# Patient Record
Sex: FEMALE | Race: WHITE | URBAN - METROPOLITAN AREA
[De-identification: names, ages, dates, MRNs, and addresses within clinical notes are randomized per-mention and may not be internally consistent; named-entity substitution may affect disease eponyms.]

---

## 2017-02-28 ENCOUNTER — OFFICE VISIT (OUTPATIENT)
Dept: INTERNAL MEDICINE | Facility: CLINIC | Age: 42
End: 2017-02-28
Payer: COMMERCIAL

## 2017-02-28 VITALS
HEIGHT: 66 IN | SYSTOLIC BLOOD PRESSURE: 94 MMHG | DIASTOLIC BLOOD PRESSURE: 60 MMHG | OXYGEN SATURATION: 96 % | TEMPERATURE: 98.4 F | WEIGHT: 137.5 LBS | HEART RATE: 75 BPM | BODY MASS INDEX: 22.1 KG/M2

## 2017-02-28 DIAGNOSIS — M54.6 CHRONIC BILATERAL THORACIC BACK PAIN: Primary | ICD-10-CM

## 2017-02-28 DIAGNOSIS — G89.29 CHRONIC BILATERAL THORACIC BACK PAIN: Primary | ICD-10-CM

## 2017-02-28 DIAGNOSIS — M54.2 NECK PAIN: ICD-10-CM

## 2017-02-28 PROCEDURE — 99213 OFFICE O/P EST LOW 20 MIN: CPT | Performed by: INTERNAL MEDICINE

## 2017-02-28 RX ORDER — FENOPROFEN CALCIUM 400 MG/1
CAPSULE ORAL
Refills: 3 | COMMUNITY
Start: 2017-02-21 | End: 2017-04-25

## 2017-02-28 RX ORDER — DURACHOL 3775; 1 [IU]/1; MG/1
CAPSULE ORAL
Refills: 3 | COMMUNITY
Start: 2017-02-06 | End: 2017-04-25

## 2017-02-28 RX ORDER — BUPROPION HYDROCHLORIDE 150 MG/1
TABLET ORAL
COMMUNITY
Start: 2017-02-08

## 2017-02-28 RX ORDER — LIDOCAINE 50 MG/G
OINTMENT TOPICAL
Refills: 3 | COMMUNITY
Start: 2017-02-06

## 2017-02-28 NOTE — PATIENT INSTRUCTIONS
Best next step is trial of physical therapy.    Naproxen 2 tabs twice a day as needed and warm compresses as needed.

## 2017-02-28 NOTE — NURSING NOTE
"Chief Complaint   Patient presents with     Musculoskeletal Problem     X 3 weeks. Mid back pain radiating to the back of neck and head and down to RLE.       Initial BP 94/60 (BP Location: Left arm, Patient Position: Chair, Cuff Size: Adult Regular)  Pulse 75  Temp 98.4  F (36.9  C) (Oral)  Ht 5' 6.2\" (1.681 m)  Wt 137 lb 8 oz (62.4 kg)  LMP 02/07/2017 (Approximate)  SpO2 96%  Breastfeeding? No  BMI 22.06 kg/m2 Estimated body mass index is 22.06 kg/(m^2) as calculated from the following:    Height as of this encounter: 5' 6.2\" (1.681 m).    Weight as of this encounter: 137 lb 8 oz (62.4 kg).  Medication Reconciliation: complete     Kaminibose MA      "

## 2017-02-28 NOTE — PROGRESS NOTES
"  SUBJECTIVE:                                                      HPI: Petra Diallo is a pleasant 41 year old female who presents with back pain:    - has history of chronic mid-back pain - indicates mid-lower thoracic spine  - precipitated by gymnastics injury when she was younger  - suffered a \"thoracic vertebral strain\"  - no particular interventions (procedures, surgeries, therapy)    - reports that back pain worsened ~3-4 weeks ago  - no precipitating injury, trauma, or unusual exertion  - pain is bilateral, R>L  - reports that pain radiates upwards to right neck and back of right head  - describes as dull and burning  - always present  - worse at the end of the day  - better with yoga and stretches (holding knees over head)    - reports that entire RUE \"goes limp\" or starts tingling sometimes - not present currently    Has gone to chiropractor twice since back pain worsened - didn't help.    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     The medication, allergy, and problem lists have been reviewed and updated as appropriate.     OBJECTIVE:                                                      BP 94/60 (BP Location: Left arm, Patient Position: Chair, Cuff Size: Adult Regular)  Pulse 75  Temp 98.4  F (36.9  C) (Oral)  Ht 5' 6.2\" (1.681 m)  Wt 137 lb 8 oz (62.4 kg)  LMP 02/07/2017 (Approximate)  SpO2 96%  Breastfeeding? No  BMI 22.06 kg/m2  Constitutional: well-appearing  Cervical spine: no deformity, crepitus, or step-off; no spinal or marlyn-spinal tenderness to palpation  Neck: supple, symmetric; normal ROM  RUE: strength full; normal ROM  Thoracic/lumbar spine: no deformity, crepitus, or step-off; no spinal or marlyn-spinal tenderness to palpation  Musculoskeletal: right trapezius tenderness to palpation    ASSESSMENT/PLAN:                                                      (M54.6,  G89.29) Chronic bilateral thoracic back pain  (primary encounter " diagnosis)  Comment:    - worsening ~3-4 weeks ago - no precipitating event.   - normal exam.   Plan:   - referred to PT for further evaluation and treatment.   - if symptoms worsen, change, or don't improve with above, patient to contact MD.    (M54.2) Neck pain  Comment: suspect right trapezius tension/strain.   Plan:    - referred to PT for further evaluation and treatment.   - if symptoms worsen, change, or don't improve with above, patient to contact MD.    The instructions on the AVS were discussed and explained to the patient. Patient expressed understanding of instructions.    Jeniffer Etienne MD   [place] [address] [address]  T: [phone], F: [phone]

## 2017-02-28 NOTE — MR AVS SNAPSHOT
After Visit Summary   2/28/2017    Petra Diallo    MRN: [id number]           Patient Information     Date Of Birth          1975        Visit Information        Provider Department      2/28/2017 1:30 PM Jeniffer Etienne MD [facility]        Today's Diagnoses     Neck pain    -  1    Chronic bilateral thoracic back pain          Care Instructions    Best next step is trial of physical therapy.    Naproxen 2 tabs twice a day as needed and warm compresses as needed.         Follow-ups after your visit        Additional Services     [facility] PT, HAND, AND CHIROPRACTIC REFERRAL       **This order will print in the [facility] Scheduling Office**    Physical Therapy, Hand Therapy and Chiropractic Care are available through:    *[facility]  *[facility]  *[facility]    Call one number to schedule at any of the above locations: [phone].    Your provider has referred you to: [facility] - PT and/or Chiropractic Care determined by clinical presentation at [facility] or [facility] Initial Visit    Indication/Reason for Referral: Neck Pain and Thoracic Pain  Onset of Illness: 3-4 weeks  Therapy Orders: Evaluate and Treat  Special Programs: None  Special Request: None    Edgar Hernández      Additional Comments for the Therapist or Chiropractor: none    Please be aware that coverage of these services is subject to the terms and limitations of your health insurance plan.  Call member services at your health plan with any benefit or coverage questions.      Please bring the following to your appointment:    *Your personal calendar for scheduling future appointments  *Comfortable clothing                  Who to contact     If you have questions or need follow up information about today's clinic visit or your schedule please contact [facility] directly at [phone].  Normal or non-critical lab and imaging  "results will be communicated to you by MyChart, letter or phone within 4 business days after the clinic has received the results. If you do not hear from us within 7 days, please contact the clinic through Eatwave or phone. If you have a critical or abnormal lab result, we will notify you by phone as soon as possible.  Submit refill requests through Eatwave or call your pharmacy and they will forward the refill request to us. Please allow 3 business days for your refill to be completed.          Additional Information About Your Visit        Eatwave Information     Eatwave lets you send messages to your doctor, view your test results, renew your prescriptions, schedule appointments and more. To sign up, go to [email or web address] . Click on \"Log in\" on the left side of the screen, which will take you to the Welcome page. Then click on \"Sign up Now\" on the right side of the page.     You will be asked to enter the access code listed below, as well as some personal information. Please follow the directions to create your username and password.     Your access code is: [email or web address]  Expires: 2017  1:53 PM     Your access code will  in 90 days. If you need help or a new code, please call your [place] clinic or [phone].        Care EveryWhere ID     This is your Care EveryWhere ID. This could be used by other organizations to access your [place] medical records  [id number]        Your Vitals Were     Pulse Temperature Height Last Period Pulse Oximetry Breastfeeding?    75 98.4  F (36.9  C) (Oral) 5' 6.2\" (1.681 m) 2017 (Approximate) 96% No    BMI (Body Mass Index)                   22.06 kg/m2            Blood Pressure from Last 3 Encounters:   17 94/60   03/10/16 102/56    Weight from Last 3 Encounters:   17 137 lb 8 oz (62.4 kg)   03/10/16 128 lb (58.1 kg)              We Performed the Following     [facility] PT, HAND, AND CHIROPRACTIC REFERRAL        Primary Care Provider    None " Specified       No primary provider on file.        Thank you!     Thank you for choosing [facility]  for your care. Our goal is always to provide you with excellent care. Hearing back from our patients is one way we can continue to improve our services. Please take a few minutes to complete the written survey that you may receive in the mail after your visit with us. Thank you!             Your Updated Medication List - Protect others around you: Learn how to safely use, store and throw away your medicines at www.disposemymeds.org.          This list is accurate as of: 2/28/17  1:53 PM.  Always use your most recent med list.                   Brand Name Dispense Instructions for use    buPROPion 150 MG 24 hr tablet    WELLBUTRIN XL     Take one tablet by mouth every morning.       DURACHOL 1-3775 MG-UNIT Caps   Generic drug:  Folic Acid-Cholecalciferol          Fenoprofen Calcium 400 MG Caps          GABAPENTIN PO      Take 200 mg by mouth 2 times daily       lidocaine 5 % ointment    XYLOCAINE         PAXIL PO      Take 20 mg by mouth daily

## 2017-03-07 ENCOUNTER — THERAPY VISIT (OUTPATIENT)
Dept: PHYSICAL THERAPY | Facility: CLINIC | Age: 42
End: 2017-03-07
Payer: COMMERCIAL

## 2017-03-07 DIAGNOSIS — M54.50 THORACOLUMBAR BACK PAIN: Primary | ICD-10-CM

## 2017-03-07 DIAGNOSIS — M54.6 THORACOLUMBAR BACK PAIN: Primary | ICD-10-CM

## 2017-03-07 PROCEDURE — 97161 PT EVAL LOW COMPLEX 20 MIN: CPT | Mod: GP | Performed by: PHYSICAL THERAPIST

## 2017-03-07 PROCEDURE — 97110 THERAPEUTIC EXERCISES: CPT | Mod: GP | Performed by: PHYSICAL THERAPIST

## 2017-03-07 NOTE — MR AVS SNAPSHOT
"              After Visit Summary   3/7/2017    Petra Diallo    MRN: [id number]           Patient Information     Date Of Birth          1975        Visit Information        Provider Department      3/7/2017 2:40 PM Mona Flores PT [facility] Physical Therapy        Today's Diagnoses     Thoracolumbar back pain    -  1       Follow-ups after your visit        Your next 10 appointments already scheduled     Mar 21, 2017  3:20 PM CDT   [facility] Spine with Mona Flores PT   [facility]    [address] [id number] [phone]              Who to contact     If you have questions or need follow up information about today's clinic visit or your schedule please contact [facility] PHYSICAL THERAPY directly at [phone].  Normal or non-critical lab and imaging results will be communicated to you by CrossChxhart, letter or phone within 4 business days after the clinic has received the results. If you do not hear from us within 7 days, please contact the clinic through CrossChxhart or phone. If you have a critical or abnormal lab result, we will notify you by phone as soon as possible.  Submit refill requests through [employer] or call your pharmacy and they will forward the refill request to us. Please allow 3 business days for your refill to be completed.          Additional Information About Your Visit        MyChart Information     [employer] lets you send messages to your doctor, view your test results, renew your prescriptions, schedule appointments and more. To sign up, go to [email or web address] . Click on \"Log in\" on the left side of the screen, which will take you to the Welcome page. Then click on \"Sign up Now\" on the right side of the page.     You will be asked to enter the access code listed below, as well as some personal information. Please " follow the directions to create your username and password.     Your access code is: [email or web address]  Expires: 2017  1:53 PM     Your access code will  in 90 days. If you need help or a new code, please call your [place] clinic or [phone].        Care EveryWhere ID     This is your Care EveryWhere ID. This could be used by other organizations to access your [place] medical records  [id number]        Your Vitals Were     Last Period                   2017 (Approximate)            Blood Pressure from Last 3 Encounters:   17 94/60   03/10/16 102/56    Weight from Last 3 Encounters:   17 62.4 kg (137 lb 8 oz)   03/10/16 58.1 kg (128 lb)              We Performed the Following     [facility] Inital Eval Report     PT Eval, Low Complexity ([id number])     Therapeutic Exercises        Primary Care Provider    None Specified       No primary provider on file.        Thank you!     Thank you for choosing [place] [facility] PHYSICAL THERAP[facility]  for your care. Our goal is always to provide you with excellent care. Hearing back from our patients is one way we can continue to improve our services. Please take a few minutes to complete the written survey that you may receive in the mail after your visit with us. Thank you!             Your Updated Medication List - Protect others around you: Learn how to safely use, store and throw away your medicines at www.disposemymeds.org.          This list is accurate as of: 3/7/17  4:29 PM.  Always use your most recent med list.                   Brand Name Dispense Instructions for use    buPROPion 150 MG 24 hr tablet    WELLBUTRIN XL     Take one tablet by mouth every morning.       DURACHOL 1-3775 MG-UNIT Caps   Generic drug:  Folic Acid-Cholecalciferol          Fenoprofen Calcium 400 MG Caps          GABAPENTIN PO      Take 200 mg by mouth 2 times daily       lidocaine 5 % ointment    XYLOCAINE         PAXIL PO      Take 20 mg by mouth  daily

## 2017-03-07 NOTE — PROGRESS NOTES
"Subjective:    Petra Diallo is a 41 year old female with a thoracic spine condition.  Condition occurred with:  Insidious onset.  Condition occurred: for unknown reasons.  This is a chronic condition  Pt presents with complaints of T-L pain, aggravated in intensity about one month ago. Pt reported long history of thoracic/lumbar pain following a \"back tuck\" in gymnastics (11th grade). Pt reported she was unable to return to gymnastics in 12th grade. Pt reported issues with back pain since that time, waxing and waning over the years. Pt reported use of chiropractic care about one year ago; pt reported going for adjustments every other week for about a year. Pt reported temporary improvement; pt discontinued about 1 month ago after she no longer received pain relief. Pt reported currently using tylenol on a daily basis for the past month for pain management.    Patient reports pain:  Thoracic left side and mid thoracic.  Radiates to:  Head and other (neck and right buttock/proximal thigh).  Pain is described as burning and other (dull) and is constant and reported as 5/10.  Associated symptoms:  Headache (pt reported waking every morning with neck pain and a HA; currently using OTC tylenol for pain management on a daily basis for the past month). Pain is worse during the day and worse during the night.  Symptoms are exacerbated by lying down (pt reported waking every other night due to back pain; able to relieve sx's with lying on her back with her knees bent up and over her head) and relieved by other (tylenol).  Since onset symptoms are unchanged.    Previous treatment includes chiropractic.  There was moderate (temporary relief; 2x/month for 1 year) improvement following previous treatment.  General health as reported by patient is good.                  Barriers include:  None as reported by the patient.    Red flags:  None as reported by the patient.                      Objective:    Standing Alignment:  " "  Cervical/Thoracic:  Thoracic kyphosis increased and forward head  Shoulder/UE:  Rounded shoulders                             Lumbar/SI Evaluation  ROM:  AROM Lumbar: normal (increased T-L pain with end range extension in standing and with JO)  AROM Thoracic:  Flex:               Ext:                WFL's; increased T-L pain at end range in sitting, standing and JO  Rotation:        Left:  WNL    Right:  Increased midline pain at end range                       Spinal Segmental Conclusions: Reports of \"burning\" with PA's to lower thoracic spine    Level: Hypo noted at T11 and L1           Cervical/Thoracic Evaluation  Cervical AROM: normal (reports of upper cervical neck pain at end range extension)                                  Shoulder Evaluation:  ROM:  AROM:  normal                                                                             General     ROS    Assessment/Plan:      Patient is a 41 year old female with thoracic complaints.    Patient has the following significant findings with corresponding treatment plan.                Diagnosis 1:  T-L pain  Pain -  manual therapy, self management, education and home program  Decreased ROM/flexibility - manual therapy and therapeutic exercise  Decreased joint mobility - manual therapy and therapeutic exercise  Decreased function - therapeutic activities  Impaired posture - neuro re-education    Therapy Evaluation Codes:   1) History comprised of:   Personal factors that impact the plan of care:      Time since onset of symptoms.    Comorbidity factors that impact the plan of care are:      None.     Medications impacting care: None.  2) Examination of Body Systems comprised of:   Body structures and functions that impact the plan of care:      Cervical spine, Lumbar spine and Thoracic Spine.   Activity limitations that impact the plan of care are:      Reading/Computer work and Sleeping.  3) Clinical presentation characteristics " are:   Stable/Uncomplicated.  4) Decision-Making    Low complexity using standardized patient assessment instrument and/or measureable assessment of functional outcome.  Cumulative Therapy Evaluation is: Low complexity.    Previous and current functional limitations:  (See Goal Flow Sheet for this information)    Short term and Long term goals: (See Goal Flow Sheet for this information)     Communication ability:  Patient appears to be able to clearly communicate and understand verbal and written communication and follow directions correctly.  Treatment Explanation - The following has been discussed with the patient:   RX ordered/plan of care  Anticipated outcomes  Possible risks and side effects  This patient would benefit from PT intervention to resume normal activities.   Rehab potential is good.    Frequency:  1 X week, once daily  Duration:  for 4-6 visits  Discharge Plan:  Achieve all LTG.  Independent in home treatment program.  Reach maximal therapeutic benefit.    Please refer to the daily flowsheet for treatment today, total treatment time and time spent performing 1:1 timed codes.

## 2017-03-08 NOTE — PROGRESS NOTES
Subjective:                                       Pertinent medical history includes:  Depression, migraines and other (Changes in bowel/bladder, headaches).      Current medications:  Anti-depressants.  Current occupation is Occupational Therapy.    Primary job tasks include:  Prolonged standing and lifting.                              Objective:    System    Physical Exam    General     ROS    Assessment/Plan:

## 2017-04-25 ENCOUNTER — OFFICE VISIT (OUTPATIENT)
Dept: INTERNAL MEDICINE | Facility: CLINIC | Age: 42
End: 2017-04-25
Payer: COMMERCIAL

## 2017-04-25 VITALS
SYSTOLIC BLOOD PRESSURE: 102 MMHG | TEMPERATURE: 97.6 F | DIASTOLIC BLOOD PRESSURE: 60 MMHG | BODY MASS INDEX: 20.99 KG/M2 | HEIGHT: 66 IN | OXYGEN SATURATION: 98 % | HEART RATE: 76 BPM | WEIGHT: 130.6 LBS

## 2017-04-25 DIAGNOSIS — G89.29 CHRONIC BILATERAL THORACIC BACK PAIN: Primary | ICD-10-CM

## 2017-04-25 DIAGNOSIS — M54.6 CHRONIC BILATERAL THORACIC BACK PAIN: Primary | ICD-10-CM

## 2017-04-25 PROCEDURE — 99212 OFFICE O/P EST SF 10 MIN: CPT | Performed by: INTERNAL MEDICINE

## 2017-04-25 NOTE — NURSING NOTE
"Chief Complaint   Patient presents with     Musculoskeletal Problem     x couple of months. Mid backpain, tried physical therapy and its not helping. Gets more stiffness on the the upper in the mornings.       Initial /60 (BP Location: Left arm, Patient Position: Chair, Cuff Size: Adult Regular)  Pulse 76  Temp 97.6  F (36.4  C) (Oral)  Ht 5' 6\" (1.676 m)  Wt 130 lb 9.6 oz (59.2 kg)  LMP 04/07/2017 (Exact Date)  SpO2 98%  Breastfeeding? No  BMI 21.08 kg/m2 Estimated body mass index is 21.08 kg/(m^2) as calculated from the following:    Height as of this encounter: 5' 6\" (1.676 m).    Weight as of this encounter: 130 lb 9.6 oz (59.2 kg).  Medication Reconciliation: complete       Kaminibose MA      "

## 2017-04-25 NOTE — PROGRESS NOTES
"  SUBJECTIVE:                                                      HPI: Petra Diallo is a pleasant 42 year old female who presents for follow-up of back pain:    - seen for back pain ~2 months ago (please see note for details)  - diagnosed with chronic bilateral thoracic back pain and referred to physical therapy  - patient went to physical therapy once and has been performing HEP regularly   - per PT note, additional therapy sessions recommended, but no further sessions scheduled  - patient reports no significant improvement in her back pain after visits and HEP   - no change or worsening either  - has been wearing a back brace while at work - helps a little    Says she would like to try to get pregnant soon - would like to avoid pain medication if possible.    The medication, allergy, and problem lists have been reviewed and updated as appropriate.       OBJECTIVE:                                                      /60 (BP Location: Left arm, Patient Position: Chair, Cuff Size: Adult Regular)  Pulse 76  Temp 97.6  F (36.4  C) (Oral)  Ht 5' 6\" (1.676 m)  Wt 130 lb 9.6 oz (59.2 kg)  LMP 04/07/2017 (Exact Date)  SpO2 98%  Breastfeeding? No  BMI 21.08 kg/m2  Constitutional: well-appearing  Psych: normal judgment and insight; normal mood and affect; recent and remote memory intact      Exam deferred as patient reports no significant change since last visit.    ASSESSMENT/PLAN:                                                      (M54.6,  G89.29) Chronic bilateral thoracic back pain  (primary encounter diagnosis)  Comment:    - no improvement with one episode of PT and HEP.   - suggested follow-up with PT as recommended , patient declines - does not think it will help.  Plan: thoracic MRI ordered - patient to schedule - recommendations to follow.     The instructions on the AVS were discussed and explained to the patient. Patient expressed understanding of instructions.    Jeniffer Etienne MD   [place] " [address] [address]  T: [phone], F: [phone]

## 2017-04-25 NOTE — MR AVS SNAPSHOT
"              After Visit Summary   4/25/2017    ePtra Diallo    MRN: [id number]           Patient Information     Date Of Birth          1975        Visit Information        Provider Department      4/25/2017 11:30 AM Jeniffer Etienne MD [facility]        Today's Diagnoses     Chronic bilateral thoracic back pain    -  1      Care Instructions    Let's get a thoracic MRI - please schedule on the way out.    Recommendations to follow.         Follow-ups after your visit        Future tests that were ordered for you today     Open Future Orders        Priority Expected Expires Ordered    MR Thoracic Spine w/o Contrast Routine  4/25/2018 4/25/2017            Who to contact     If you have questions or need follow up information about today's clinic visit or your schedule please contact [facility] directly at [phone].  Normal or non-critical lab and imaging results will be communicated to you by [email or web address], letter or phone within 4 business days after the clinic has received the results. If you do not hear from us within 7 days, please contact the clinic through [email or web address] or phone. If you have a critical or abnormal lab result, we will notify you by phone as soon as possible.  Submit refill requests through StyleHaul or call your pharmacy and they will forward the refill request to us. Please allow 3 business days for your refill to be completed.          Additional Information About Your Visit        MyChart Information     StyleHaul lets you send messages to your doctor, view your test results, renew your prescriptions, schedule appointments and more. To sign up, go to [email or web address] . Click on \"Log in\" on the left side of the screen, which will take you to the Welcome page. Then click on \"Sign up Now\" on the right side of the page.     You will be asked to enter the access code listed below, as well as some personal information. Please follow the " "directions to create your username and password.     Your access code is: [email or web address]  Expires: 2017  2:53 PM     Your access code will  in 90 days. If you need help or a new code, please call your [facility] or [phone].        Care EveryWhere ID     This is your Care EveryWhere ID. This could be used by other organizations to access your [place] medical records  [id number]        Your Vitals Were     Pulse Temperature Height Last Period Pulse Oximetry Breastfeeding?    76 97.6  F (36.4  C) (Oral) 5' 6\" (1.676 m) 2017 (Exact Date) 98% No    BMI (Body Mass Index)                   21.08 kg/m2            Blood Pressure from Last 3 Encounters:   17 102/60   17 94/60   03/10/16 102/56    Weight from Last 3 Encounters:   17 130 lb 9.6 oz (59.2 kg)   17 137 lb 8 oz (62.4 kg)   03/10/16 128 lb (58.1 kg)               Primary Care Provider Office Phone # Fax #    Jeniffer Etienne [phone]       [facility] [address] [facility] [address]        Thank you!     Thank you for choosing [facility]  for your care. Our goal is always to provide you with excellent care. Hearing back from our patients is one way we can continue to improve our services. Please take a few minutes to complete the written survey that you may receive in the mail after your visit with us. Thank you!             Your Updated Medication List - Protect others around you: Learn how to safely use, store and throw away your medicines at www.disposemymeds.org.          This list is accurate as of: 17 11:37 AM.  Always use your most recent med list.                   Brand Name Dispense Instructions for use    buPROPion 150 MG 24 hr tablet    WELLBUTRIN XL     Take one tablet by mouth every morning.       GABAPENTIN PO      Take 200 mg by mouth 2 times daily       lidocaine 5 % ointment    XYLOCAINE         PAXIL PO      Take 20 mg by " mouth daily

## 2017-04-28 ENCOUNTER — HOSPITAL ENCOUNTER (OUTPATIENT)
Dept: MRI IMAGING | Facility: CLINIC | Age: 42
Discharge: HOME | End: 2017-04-28
Attending: INTERNAL MEDICINE | Admitting: INTERNAL MEDICINE
Payer: COMMERCIAL

## 2017-04-28 DIAGNOSIS — G89.29 CHRONIC BILATERAL THORACIC BACK PAIN: ICD-10-CM

## 2017-04-28 DIAGNOSIS — M54.6 CHRONIC BILATERAL THORACIC BACK PAIN: ICD-10-CM

## 2017-04-28 PROCEDURE — 72146 MRI CHEST SPINE W/O DYE: CPT

## 2017-05-01 ENCOUNTER — OFFICE VISIT (OUTPATIENT)
Dept: NEUROSURGERY | Facility: CLINIC | Age: 42
End: 2017-05-01
Attending: NEUROLOGICAL SURGERY
Payer: COMMERCIAL

## 2017-05-01 VITALS
WEIGHT: 130.4 LBS | BODY MASS INDEX: 21.73 KG/M2 | HEIGHT: 65 IN | DIASTOLIC BLOOD PRESSURE: 66 MMHG | OXYGEN SATURATION: 100 % | HEART RATE: 74 BPM | SYSTOLIC BLOOD PRESSURE: 110 MMHG | TEMPERATURE: 97.4 F

## 2017-05-01 DIAGNOSIS — M51.24 THORACIC DISC HERNIATION: Primary | ICD-10-CM

## 2017-05-01 PROCEDURE — 99211 OFF/OP EST MAY X REQ PHY/QHP: CPT | Performed by: NEUROLOGICAL SURGERY

## 2017-05-01 PROCEDURE — 99204 OFFICE O/P NEW MOD 45 MIN: CPT | Performed by: NEUROLOGICAL SURGERY

## 2017-05-01 RX ORDER — CYCLOBENZAPRINE HCL 5 MG
5 TABLET ORAL 3 TIMES DAILY PRN
Qty: 90 TABLET | Refills: 0 | Status: SHIPPED | OUTPATIENT
Start: 2017-05-01

## 2017-05-01 NOTE — NURSING NOTE
"Petra Diallo is a 42 year old female who presents for:  Chief Complaint   Patient presents with     Neurologic Problem     Moderate-sized right central disc herniation Referred by Dr. Jeniffer Etienne MRI indicates at T9-T10 causing mild mass effect on the dural sac        Initial Vitals:  /66 (BP Location: Right arm, Patient Position: Chair, Cuff Size: Adult Large)  Pulse 74  Temp 97.4  F (36.3  C) (Oral)  Ht 5' 4.57\" (1.64 m)  Wt 130 lb 6.4 oz (59.1 kg)  LMP 04/07/2017 (Exact Date)  SpO2 100%  BMI 21.99 kg/m2 Estimated body mass index is 21.99 kg/(m^2) as calculated from the following:    Height as of this encounter: 5' 4.57\" (1.64 m).    Weight as of this encounter: 130 lb 6.4 oz (59.1 kg).. Body surface area is 1.64 meters squared. BP completed using cuff size: regular  Data Unavailable    Do you feel safe in your environment?  Yes  Do you need any refills today? Yes muscle relaxer    Nursing Comments: Moderate-sized right central disc herniation Referred by Dr. Jeniffer Etienne MRI indicates at T9-T10 causing mild mass effect on the dural sac.  Patient rates 6 pain today as 5/1/17      5 min. nursing intake time  Nathaly Canchola CMA      Discharge plan: See doctor dictation  2 min. nursing discharge time  Nathaly Canchola CMA         "

## 2017-05-01 NOTE — MR AVS SNAPSHOT
"              After Visit Summary   5/1/2017    Petra Diallo    MRN: [id number]           Patient Information     Date Of Birth          1975        Visit Information        Provider Department      5/1/2017 11:40 AM Americo Etienne MD [facility]        Today's Diagnoses     Chronic bilateral thoracic back pain    -  1      Care Instructions    [facility]il prescription sent to [facility] in [place].    Referral to Dr. Javan Dominguez with [facility] for injection. They will call to schedule.         Follow-ups after your visit        Future tests that were ordered for you today     Open Future Orders        Priority Expected Expires Ordered    XR Cervical/Thoracic Epidural Inj Incl Imaging Routine 5/1/2017 5/1/2018 5/1/2017            Who to contact     If you have questions or need follow up information about today's clinic visit or your schedule please contact [facility] directly at [phone].  Normal or non-critical lab and imaging results will be communicated to you by MyChart, letter or phone within 4 business days after the clinic has received the results. If you do not hear from us within 7 days, please contact the clinic through MindStorm LLChart or phone. If you have a critical or abnormal lab result, we will notify you by phone as soon as possible.  Submit refill requests through WHILL or call your pharmacy and they will forward the refill request to us. Please allow 3 business days for your refill to be completed.          Additional Information About Your Visit        MindStorm LLChart Information     WHILL lets you send messages to your doctor, view your test results, renew your prescriptions, schedule appointments and more. To sign up, go to [email or web address] . Click on \"Log in\" on the left side of the screen, which will take you to the Welcome page. Then click on \"Sign up Now\" on the right side of the page.     You will be asked to enter " "the access code listed below, as well as some personal information. Please follow the directions to create your username and password.     Your access code is: [email or web address]  Expires: 2017  2:53 PM     Your access code will  in 90 days. If you need help or a new code, please call your [facility] or [phone].        Care EveryWhere ID     This is your Care EveryWhere ID. This could be used by other organizations to access your [place] medical records  [id number]        Your Vitals Were     Pulse Temperature Height Last Period Pulse Oximetry BMI (Body Mass Index)    74 97.4  F (36.3  C) (Oral) 5' 4.57\" (1.64 m) 2017 (Exact Date) 100% 21.99 kg/m2       Blood Pressure from Last 3 Encounters:   17 110/66   17 102/60   17 94/60    Weight from Last 3 Encounters:   17 130 lb 6.4 oz (59.1 kg)   17 130 lb 9.6 oz (59.2 kg)   17 137 lb 8 oz (62.4 kg)                 Today's Medication Changes          These changes are accurate as of: 17 12:14 PM.  If you have any questions, ask your nurse or doctor.               Start taking these medicines.        Dose/Directions    cyclobenzaprine 5 MG tablet   Commonly known as:  FLEXERIL   Used for:  Chronic bilateral thoracic back pain   Started by:  Americo Etienne MD        Dose:  5 mg   Take 1 tablet (5 mg) by mouth 3 times daily as needed for muscle spasms   Quantity:  90 tablet   Refills:  0            Where to get your medicines      These medications were sent to [id number] - [facility] [address] AT [facility] & [address], [facility] [address]    Hours:  24-hours Phone:  [phone]     cyclobenzaprine 5 MG tablet                Primary Care Provider Office Phone # Fax #    Jeniffer Etienne [phone]       [facility] [address] [place] [id number]        Thank you!     Thank you for choosing [facility]  for your care. Our goal is always to provide you with excellent care. Hearing back from our patients is one way we can continue to improve our services. Please take a few minutes to complete the written survey that you may receive in the mail after your visit with us. Thank you!             Your Updated Medication List - Protect others around you: Learn how to safely use, store and throw away your medicines at www.disposemymeds.org.          This list is accurate as of: 5/1/17 12:14 PM.  Always use your most recent med list.                   Brand Name Dispense Instructions for use    buPROPion 150 MG 24 hr tablet    WELLBUTRIN XL     Take one tablet by mouth every morning.       cyclobenzaprine 5 MG tablet    FLEXERIL    90 tablet    Take 1 tablet (5 mg) by mouth 3 times daily as needed for muscle spasms       GABAPENTIN PO      Take 200 mg by mouth 2 times daily       lidocaine 5 % ointment    XYLOCAINE         PAXIL PO      Take 20 mg by mouth daily

## 2017-05-01 NOTE — PATIENT INSTRUCTIONS
Flexeril prescription sent to [facility] in [place].    Referral to Dr. Javan Dominguez with [facility] for injection. They will call to schedule.

## 2017-05-01 NOTE — PROGRESS NOTES
42-year-old female with severe mid thoracic back pain, T9 10 calcified disc herniation.  Notes that her pain began in high school when she was doing gymnastics.  Chronic pain since then.  For the last three months.  She is noted seven out of 10, sharp midline/right sided back pain in the lower thoracic spine, with some radiation to the right neck and right buttocks.  She has been doing some extension exercises as recommended by her physical therapist, with some improvement.  Worsened by lifting at work.  She works as an occupational therapist.         Past Medical History:   Diagnosis Date     Depression     Longstanding, stable.     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     Social History Main Topics     Smoking status: Never Smoker     Smokeless tobacco: Not on file     Alcohol use No     Drug use: No     Sexual activity: Yes     Partners: Male     Birth control/ protection: Condom     Other Topics Concern     Not on file     Social History Narrative     Family History   Problem Relation Age of Onset     DIABETES No family hx of      Coronary Artery Disease No family hx of      Hypertension No family hx of      Hyperlipidemia No family hx of      CEREBROVASCULAR DISEASE No family hx of      Breast Cancer No family hx of      Colon Cancer No family hx of      Prostate Cancer No family hx of      Other Cancer No family hx of      Depression No family hx of      Anxiety Disorder No family hx of      MENTAL ILLNESS No family hx of      Substance Abuse No family hx of      Anesthesia Reaction No family hx of      Asthma No family hx of      OSTEOPOROSIS No family hx of      Genetic Disorder No family hx  "of      Thyroid Disease No family hx of      Obesity No family hx of      Unknown/Adopted No family hx of         ROS: 10 point ROS neg other than the symptoms noted above in the HPI.    Physical Exam  /66 (BP Location: Right arm, Patient Position: Chair, Cuff Size: Adult Large)  Pulse 74  Temp 97.4  F (36.3  C) (Oral)  Ht 1.64 m (5' 4.57\")  Wt 59.1 kg (130 lb 6.4 oz)  LMP 04/07/2017 (Exact Date)  SpO2 100%  BMI 21.99 kg/m2  HEENT:  Normocephalic, atraumatic.  PERRLA.  EOM s intact.  Visual fields full to gross exam  Neck:  Supple, non-tender, without lymphadenopathy.  Heart:  No peripheral edema  Lungs:  No SOB  Abdomen:  Non-distended.   Skin:  Warm and dry.  Extremities:  No edema, cyanosis or clubbing.    NEUROLOGICAL EXAMINATION:     Mental status:  Alert and Oriented x 3, speech is fluent.  Cranial nerves:  II-XII intact.   Motor:    Shoulder Abduction:  Right:  5/5   Left:  5/5  Biceps:                      Right:  5/5   Left:  5/5  Triceps:                     Right:  5/5   Left:  5/5  Wrist Extensors:       Right:  5/5   Left:  5/5  Wrist Flexors:           Right:  5/5   Left:  5/5  interosseus :            Right:  5/5   Left:  5/5   Hip Flexor:                Right: 5/5  Left:  5/5  Hip Adductor:             Right:  5/5  Left:  5/5  Hip Abductor:             Right:  5/5  Left:  5/5  Gastroc Soleus:        Right:  5/5  Left:  5/5  Tib/Ant:                      Right:  5/5  Left:  5/5  EHL:                     Right:  5/5  Left:  5/5  Sensation:  Intact  Reflexes:  Negative Babinski.  Negative Clonus.  Negative Crews's.  Coordination:  Smooth finger to nose testing.   Negative pronator drift.  Smooth tandem walking.    A/P:  42-year-old female with severe mid thoracic back pain, T9 10 calcified disc herniation    I had a lengthy discussion with the patient, reviewing the history, symptoms and imaging  We referred her for a epidural steroid injection  We also agreed to continue physical " therapy  We prescribed a muscle relaxant  We discussed that we will proceed with non-surgical management at this point, could further discuss intervention pending the results of TARAS

## 2017-05-09 ENCOUNTER — TRANSFERRED RECORDS (OUTPATIENT)
Dept: HEALTH INFORMATION MANAGEMENT | Facility: CLINIC | Age: 42
End: 2017-05-09

## 2017-05-09 ENCOUNTER — DOCUMENTATION ONLY (OUTPATIENT)
Dept: NEUROSURGERY | Facility: CLINIC | Age: 42
End: 2017-05-09

## 2017-05-09 NOTE — PATIENT INSTRUCTIONS
Received call from Dr. Javan Dominguez at [facility]. Patient had T9-10 TARAS with him today. Her pain decreased from 4 to 2, but she's also complaining of some neck pain now. Dr. Dominguez will have his team fax over the office notes. Communicated with Dr. Etienne as well.

## 2017-06-15 ENCOUNTER — TELEPHONE (OUTPATIENT)
Dept: NEUROSURGERY | Facility: CLINIC | Age: 42
End: 2017-06-15

## 2017-06-15 DIAGNOSIS — M51.24 THORACIC DISC HERNIATION: Primary | ICD-10-CM

## 2017-06-15 RX ORDER — METHOCARBAMOL 500 MG/1
500-1000 TABLET, FILM COATED ORAL 3 TIMES DAILY PRN
Qty: 90 TABLET | Refills: 0 | Status: SHIPPED | OUTPATIENT
Start: 2017-06-15 | End: 2017-07-17

## 2017-06-15 NOTE — TELEPHONE ENCOUNTER
Patient called back and would like to proceed with robaxin. Placed order and sent to patient's pharmacy.

## 2017-06-15 NOTE — TELEPHONE ENCOUNTER
Spoke with Peng Méndez PA-C and we can offer patient robaxin to try instead of flexeril. LVM with patient and advised her to call back if she'd like to proceed with this option.

## 2017-06-15 NOTE — TELEPHONE ENCOUNTER
Patient is wondering if we can give her something other than flexeril, because it makes her very tired. She only takes this at night and is looking for relief during the day.

## 2017-07-17 ENCOUNTER — OFFICE VISIT (OUTPATIENT)
Dept: NEUROSURGERY | Facility: CLINIC | Age: 42
End: 2017-07-17
Attending: NEUROLOGICAL SURGERY
Payer: COMMERCIAL

## 2017-07-17 VITALS
HEIGHT: 65 IN | OXYGEN SATURATION: 98 % | HEART RATE: 74 BPM | TEMPERATURE: 98.4 F | SYSTOLIC BLOOD PRESSURE: 107 MMHG | WEIGHT: 131 LBS | DIASTOLIC BLOOD PRESSURE: 71 MMHG | BODY MASS INDEX: 21.83 KG/M2

## 2017-07-17 DIAGNOSIS — M51.24 THORACIC DISC HERNIATION: Primary | ICD-10-CM

## 2017-07-17 PROCEDURE — 99214 OFFICE O/P EST MOD 30 MIN: CPT | Performed by: NEUROLOGICAL SURGERY

## 2017-07-17 PROCEDURE — 99211 OFF/OP EST MAY X REQ PHY/QHP: CPT | Performed by: NEUROLOGICAL SURGERY

## 2017-07-17 RX ORDER — METHOCARBAMOL 500 MG/1
500-1000 TABLET, FILM COATED ORAL 3 TIMES DAILY PRN
Qty: 90 TABLET | Refills: 1 | Status: SHIPPED | OUTPATIENT
Start: 2017-07-17

## 2017-07-17 ASSESSMENT — PAIN SCALES - GENERAL: PAINLEVEL: SEVERE PAIN (6)

## 2017-07-17 NOTE — PATIENT INSTRUCTIONS
Referral to Dr. Mart Gaston. You may call to schedule: [phone]     Refilled Robaxin. May follow up on this prescription with Dr. Mart Gaston after you've scheduled appt with him.

## 2017-07-17 NOTE — NURSING NOTE
"Petra Diallo is a 42 year old female who presents for:  Chief Complaint   Patient presents with     Neurologic Problem     still having burning pain T9-T10 with headaches, inj done 5/9/17, MRI 4/28/17        Initial Vitals:  /71 (BP Location: Right arm, Patient Position: Chair, Cuff Size: Adult Regular)  Pulse 74  Temp 98.4  F (36.9  C)  Ht 5' 4.5\" (1.638 m)  Wt 131 lb (59.4 kg)  SpO2 98%  BMI 22.14 kg/m2 Estimated body mass index is 22.14 kg/(m^2) as calculated from the following:    Height as of this encounter: 5' 4.5\" (1.638 m).    Weight as of this encounter: 131 lb (59.4 kg).. Body surface area is 1.64 meters squared. BP completed using cuff size: regular  Severe Pain (6)    Do you feel safe in your environment?  Yes  Do you need any refills today? No    Nursing Comments: still having burning pain T9-T10 with headaches, inj done 5/9/17, MRI 4/28/17.  Patient rates neck/head pain today as 6.      5 min. nursing intake time  Sol Downing CMA      Discharge plan: See provider's dictation.  2 min. nursing discharge time  Sol Downing CMA    "

## 2017-07-17 NOTE — PROGRESS NOTES
42-year-old female with severe mid thoracic back pain, T9 10 calcified disc herniation.  Notes that her pain began in high school when she was doing gymnastics.  Chronic pain since then.  For the last three months.  She is noted seven out of 10, sharp midline/right sided back pain in the lower thoracic spine, with some radiation to the right neck and right buttocks.  She has been doing some extension exercises as recommended by her physical therapist, with some improvement.  Worsened by lifting at work.  She works as an occupational therapist.    She noted 2 weeks of marked pain relief after the injection, but this has relented, and her pain is back to prior levels.     Past Medical History:   Diagnosis Date     Depression     Longstanding, stable.     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     Social History Main Topics     Smoking status: Never Smoker     Smokeless tobacco: Not on file     Alcohol use No     Drug use: No     Sexual activity: Yes     Partners: Male     Birth control/ protection: Condom     Other Topics Concern     Not on file     Social History Narrative     Family History   Problem Relation Age of Onset     DIABETES No family hx of      Coronary Artery Disease No family hx of      Hypertension No family hx of      Hyperlipidemia No family hx of      CEREBROVASCULAR DISEASE No family hx of      Breast Cancer No family hx of      Colon Cancer No family hx of      Prostate Cancer No family hx of      Other Cancer No family hx of      Depression No family hx of      Anxiety Disorder No family hx of      MENTAL ILLNESS No family hx of      Substance Abuse No family hx of      Anesthesia  "Reaction No family hx of      Asthma No family hx of      OSTEOPOROSIS No family hx of      Genetic Disorder No family hx of      Thyroid Disease No family hx of      Obesity No family hx of      Unknown/Adopted No family hx of         ROS: 10 point ROS neg other than the symptoms noted above in the HPI.    Physical Exam  /71 (BP Location: Right arm, Patient Position: Chair, Cuff Size: Adult Regular)  Pulse 74  Temp 98.4  F (36.9  C)  Ht 1.638 m (5' 4.5\")  Wt 59.4 kg (131 lb)  SpO2 98%  BMI 22.14 kg/m2  HEENT:  Normocephalic, atraumatic.  PERRLA.  EOM s intact.  Visual fields full to gross exam  Neck:  Supple, non-tender, without lymphadenopathy.  Heart:  No peripheral edema  Lungs:  No SOB  Abdomen:  Non-distended.   Skin:  Warm and dry.  Extremities:  No edema, cyanosis or clubbing.    NEUROLOGICAL EXAMINATION:     Mental status:  Alert and Oriented x 3, speech is fluent.  Cranial nerves:  II-XII intact.   Motor:    Shoulder Abduction:  Right:  5/5   Left:  5/5  Biceps:                      Right:  5/5   Left:  5/5  Triceps:                     Right:  5/5   Left:  5/5  Wrist Extensors:       Right:  5/5   Left:  5/5  Wrist Flexors:           Right:  5/5   Left:  5/5  interosseus :            Right:  5/5   Left:  5/5   Hip Flexor:                Right: 5/5  Left:  5/5  Hip Adductor:             Right:  5/5  Left:  5/5  Hip Abductor:             Right:  5/5  Left:  5/5  Gastroc Soleus:        Right:  5/5  Left:  5/5  Tib/Ant:                      Right:  5/5  Left:  5/5  EHL:                     Right:  5/5  Left:  5/5  Sensation:  Intact  Reflexes:  Negative Babinski.  Negative Clonus.  Negative Crews's.  Coordination:  Smooth finger to nose testing.   Negative pronator drift.  Smooth tandem walking.    A/P:  42-year-old female with severe mid thoracic back pain, T9 10 calcified disc herniation    We discussed that I would not favor surgery for this  I referred her to Dr. Gaston for discussion of " possible further therapy options, injections, or facet rhizotomy

## 2017-07-19 ENCOUNTER — TRANSFERRED RECORDS (OUTPATIENT)
Dept: HEALTH INFORMATION MANAGEMENT | Facility: CLINIC | Age: 42
End: 2017-07-19

## 2017-07-25 ENCOUNTER — TRANSFERRED RECORDS (OUTPATIENT)
Dept: HEALTH INFORMATION MANAGEMENT | Facility: CLINIC | Age: 42
End: 2017-07-25

## 2017-08-09 ENCOUNTER — TRANSFERRED RECORDS (OUTPATIENT)
Dept: HEALTH INFORMATION MANAGEMENT | Facility: CLINIC | Age: 42
End: 2017-08-09

## 2017-09-12 ENCOUNTER — TRANSFERRED RECORDS (OUTPATIENT)
Dept: HEALTH INFORMATION MANAGEMENT | Facility: CLINIC | Age: 42
End: 2017-09-12

## 2017-09-18 ENCOUNTER — TRANSFERRED RECORDS (OUTPATIENT)
Dept: HEALTH INFORMATION MANAGEMENT | Facility: CLINIC | Age: 42
End: 2017-09-18

## 2017-09-21 ENCOUNTER — TRANSFERRED RECORDS (OUTPATIENT)
Dept: HEALTH INFORMATION MANAGEMENT | Facility: CLINIC | Age: 42
End: 2017-09-21

## 2017-11-29 ENCOUNTER — TRANSFERRED RECORDS (OUTPATIENT)
Dept: HEALTH INFORMATION MANAGEMENT | Facility: CLINIC | Age: 42
End: 2017-11-29

## 2017-12-12 ENCOUNTER — TRANSFERRED RECORDS (OUTPATIENT)
Dept: HEALTH INFORMATION MANAGEMENT | Facility: CLINIC | Age: 42
End: 2017-12-12

## 2017-12-31 ENCOUNTER — HEALTH MAINTENANCE LETTER (OUTPATIENT)
Age: 42
End: 2017-12-31

## 2018-06-28 ENCOUNTER — TELEPHONE (OUTPATIENT)
Dept: OBGYN | Facility: CLINIC | Age: 43
End: 2018-06-28

## 2018-06-28 NOTE — TELEPHONE ENCOUNTER
Pt is past due for f/u pap smear.  Reminder letter was sent 12/08/17.  LMTC and schedule at [facility].  Left this writer's number in case of questions ([phone]).  If no reply and/or appt within 2 weeks (07/12/18) pt will be considered lost to pap tracking f/u.  Erica Ruelas,    Pap Tracking

## 2019-01-24 PROBLEM — M54.50 THORACOLUMBAR BACK PAIN: Status: RESOLVED | Noted: 2017-03-07 | Resolved: 2019-01-24

## 2019-01-24 PROBLEM — M54.6 THORACOLUMBAR BACK PAIN: Status: RESOLVED | Noted: 2017-03-07 | Resolved: 2019-01-24

## 2019-01-24 NOTE — PROGRESS NOTES
Patient seen for one time evaluation and treatment.  Patient did not return for further treatment and current status is unknown.  Please see initial evaluation for further information.